# Patient Record
Sex: MALE | Race: WHITE
[De-identification: names, ages, dates, MRNs, and addresses within clinical notes are randomized per-mention and may not be internally consistent; named-entity substitution may affect disease eponyms.]

---

## 2017-05-09 ENCOUNTER — HOSPITAL ENCOUNTER (OUTPATIENT)
Dept: HOSPITAL 63 - ECHO | Age: 52
Discharge: HOME | End: 2017-05-09
Attending: FAMILY MEDICINE
Payer: OTHER GOVERNMENT

## 2017-05-09 DIAGNOSIS — I10: ICD-10-CM

## 2017-05-09 DIAGNOSIS — I08.2: Primary | ICD-10-CM

## 2017-05-09 PROCEDURE — 93306 TTE W/DOPPLER COMPLETE: CPT

## 2017-05-09 NOTE — CARD
--------------- APPROVED REPORT --------------





EXAM: Two-dimensional and M-mode echocardiogram with Doppler and color Doppler.



Other Information 

Quality : GoodHR: 64bpm

Rhythm : NSR



INDICATION

Hypertension/HCVD

Dyspnea with exertion



RISK FACTORS

Obesity   



2D DIMENSIONS 

RVDd3.4 (2.9-3.5cm)Left Atrium(2D)4.1 (1.6-4.0cm)

IVSd1.5 (0.7-1.1cm)Aortic Root(2D)3.5 (2.0-3.7cm)

LVDd4.3 (3.9-5.9cm)LVOT Diameter2.4 (1.8-2.4cm)

PWd1.3 (0.7-1.1cm)LVDs3.0 (2.5-4.0cm)

FS (%) 28.7 %SV45.4 ml

LVEF(%)55.6 (>50%)



Aortic Valve

AoV Peak Yonatan.171.6cm/sAoV VTI34.4cm

AO Peak GR.11.8mmHgLVOT Peak Yonatan.122.5cm/s

LVOT  VTI 28.62cmAO Mean GR.6mmHg

MELLO (VMAX)3.48bi8YFI   (VTI)3.64cm2



Mitral Valve

MV E Unwqcecp565.0cm/sMV E Peak Gr.4mmHg

MV DECEL [id number] A Mflddyuv91.6cm/s

MV E Mean Gr.2mmHgE/A  Ratio1.5

MV A Soabynoj57ea



Pulmonary Valve

PV Peak Djezeolk710.0cm/sPV Peak Grad.7mmHg



Tricuspid Valve

TR P. Qinpywck473yo/sTR Peak Gr.27mmHg



Pulmonary Vein

S1 Hwujkxef94.6cm/sD2 Omhofglh48.1cm/s



 LEFT VENTRICLE 

The left ventricle is normal size. There is mild left ventricular hypertrophy. The left ventricular s
ystolic function is normal. The Ejection Fraction is 55-60%. There is normal LV segmental wall motion
. The left ventricular diastolic function and filling is normal for age.



 RIGHT VENTRICLE 

The right ventricle is normal size. There is normal right ventricular wall thickness. The right ventr
icular systolic function is normal.



 ATRIA 

The left atrium size is normal. The right atrium size is normal. The interatrial septum is intact wit
h no evidence for an atrial septal defect or patent foramen ovale as noted on 2-D or Doppler imaging.




 AORTIC VALVE 

The aortic valve is normal in structure and function. Doppler and Color Flow revealed trace aortic re
gurgitation. There is no significant aortic valvular stenosis.



 MITRAL VALVE 

The mitral valve leaflets are thickened. There is no evidence of mitral valve prolapse. There is no m
itral valve stenosis. Doppler and Color Flow revealed no mitral valve regurgitation noted.



 TRICUSPID VALVE 

Doppler and Color Flow revealed mild tricuspid regurgitation. The pulmonary artery systolic pressure 
is estimated at 30 mmHg. There is no pulmonary hypertension.



 PULMONIC VALVE 

Doppler and Color Flow revealed no pulmonic valvular regurgitation. There is no pulmonic valvular liza
nosis.



 GREAT VESSELS 

The aortic root is normal in size. The ascending aorta is Mildly dilated. The pulmonary artery is nor
mal. The IVC is normal in size and collapses >50% with inspiration.



 PERICARDIAL EFFUSION 

There is no evidence of significant pericardial effusion.



Critical Notification

Critical Value: No



<Conclusion>

The left ventricular systolic function is normal.

The Ejection Fraction is 55-60%.

There is normal LV segmental wall motion.

Trace aortic regurgitation.

Mild tricuspid regurgitation.

The pulmonary artery systolic pressure is estimated at 30 mmHg. 

There is no evidence of significant pericardial effusion.

## 2021-11-03 ENCOUNTER — HOSPITAL ENCOUNTER (OUTPATIENT)
Dept: HOSPITAL 63 - SURG | Age: 56
Discharge: HOME | End: 2021-11-03
Attending: ANESTHESIOLOGY
Payer: OTHER GOVERNMENT

## 2021-11-03 VITALS
DIASTOLIC BLOOD PRESSURE: 88 MMHG | DIASTOLIC BLOOD PRESSURE: 88 MMHG | SYSTOLIC BLOOD PRESSURE: 149 MMHG | SYSTOLIC BLOOD PRESSURE: 149 MMHG | DIASTOLIC BLOOD PRESSURE: 88 MMHG | SYSTOLIC BLOOD PRESSURE: 149 MMHG

## 2021-11-03 DIAGNOSIS — I10: ICD-10-CM

## 2021-11-03 DIAGNOSIS — M54.2: Primary | ICD-10-CM

## 2021-11-03 DIAGNOSIS — E66.9: ICD-10-CM

## 2021-11-03 DIAGNOSIS — M50.30: ICD-10-CM

## 2021-11-03 DIAGNOSIS — Z79.899: ICD-10-CM

## 2021-11-03 PROCEDURE — 99204 OFFICE O/P NEW MOD 45 MIN: CPT

## 2021-11-03 PROCEDURE — G0463 HOSPITAL OUTPT CLINIC VISIT: HCPCS

## 2021-12-09 ENCOUNTER — HOSPITAL ENCOUNTER (OUTPATIENT)
Dept: HOSPITAL 63 - RAD | Age: 56
End: 2021-12-09
Attending: FAMILY MEDICINE
Payer: OTHER GOVERNMENT

## 2021-12-09 DIAGNOSIS — M79.605: Primary | ICD-10-CM

## 2021-12-09 PROCEDURE — 93971 EXTREMITY STUDY: CPT

## 2021-12-09 NOTE — RAD
Examination: LEFT LOWER EXTREMITY - UNILATERAL VENOUS DOPPLER



Technique: Ultrasound evaluation of the left lower extremity was performed from the groin to the uppe
r calf with grey scale, spectral and color doppler evaluation.



Indication: Leg swelling



Comparison: None



Findings: There is normal venous flow and compressibility of left common femoral vein, femoral vein, 
popliteal vein, and visualized proximal calf veins. Of note, exam is limited given suboptimal sonogra
phic penetration.



Impression: No evidence for deep vein thrombosis of left lower extremity from the level of the calf v
eins to the groins.



Electronically signed by: Rufino Bazzi MD (12/9/2021 4:03 PM) TIARA

## 2022-01-19 ENCOUNTER — HOSPITAL ENCOUNTER (OUTPATIENT)
Dept: HOSPITAL 61 - ECHO | Age: 57
End: 2022-01-19
Attending: INTERNAL MEDICINE
Payer: OTHER GOVERNMENT

## 2022-01-19 DIAGNOSIS — R00.2: ICD-10-CM

## 2022-01-19 DIAGNOSIS — I51.7: Primary | ICD-10-CM

## 2022-01-19 PROCEDURE — 93306 TTE W/DOPPLER COMPLETE: CPT

## 2022-01-19 PROCEDURE — C8929 TTE W OR WO FOL WCON,DOPPLER: HCPCS

## 2022-01-19 NOTE — CARD
MR#: [id number]

Account#: [id number]

Accession#: [id number]

Date of Study: 01/19/2022

Ordering Physician: DEANN MOONEY, 

Referring Physician: DEANN MOONEY, 

Tech: [facility]





--------------- APPROVED REPORT --------------





EXAM: Two-dimensional and M-mode echocardiogram with Doppler and color Doppler.



Other Information 

Quality : Technically LimitedHR: 61bpm

Rhythm : NSR



INDICATION

Palpitations 



RISK FACTORS

Hypertension 

Obesity   



2D DIMENSIONS 

RVDd3.0 (2.9-3.5cm)Left Atrium(2D)4.3 (1.6-4.0cm)

IVSd1.1 (0.7-1.1cm)Aortic Root(2D)3.7 (2.0-3.7cm)

LVDd4.9 (3.9-5.9cm)LVOT Diameter2.1 (1.8-2.4cm)

PWd1.1 (0.7-1.1cm)LVDs2.4 (2.5-4.0cm)

FS (%) 50.7 %SV91.0 ml



Aortic Valve

AoV Peak Arnoldo.132.7cm/sAoV VTI24.8cm

AO Peak GR.7.0mmHgLVOT Peak Arnoldo.133.2cm/s

AO Mean GR.3mmHgAVA (VMAX)3.37cm2



Mitral Valve

MV E Pmcmsnad90.6cm/sMV DECEL [id number]

MV A Tlmoayjp41.2cm/sE/A  Ratio0.9



Pulmonary Valve

PV Peak Aikelwmf643.8cm/s



Tricuspid Valve

TR P. Ivdgingc158yb/sTR Peak Gr.24mmHg



 LEFT VENTRICLE 

The left ventricle is normal size. There is borderline concentric left ventricular hypertrophy. The l
eft ventricular systolic function is normal and the ejection fraction is within normal range. LV ejec
tion fraction of 55 to 60%. There is normal LV segmental wall motion. Transmitral Doppler flow patter
n is Grade I-abnormal relaxation pattern.



 RIGHT VENTRICLE 

The right ventricle is normal size. There is normal right ventricular wall thickness. The right ventr
icular systolic function is normal.



 ATRIA 

The left atrium size is normal. The right atrium size is normal. The interatrial septum is intact wit
h no evidence for an atrial septal defect or patent foramen ovale as noted on 2-D or Doppler imaging.




 AORTIC VALVE 

The aortic valve is normal in structure and function. Doppler and Color Flow revealed no significant 
aortic regurgitation. There is no significant aortic valvular stenosis.



 MITRAL VALVE 

The mitral valve is normal in structure and function. There is no evidence of mitral valve prolapse. 
There is no mitral valve stenosis. Doppler and Color-flow revealed trace mitral regurgitation.



 TRICUSPID VALVE 

The tricuspid valve is normal in structure and function. Doppler and Color Flow revealed trace tricus
pid regurgitation. Estimated PAP 27-30 mmHg. There is no tricuspid valve stenosis.



 PULMONIC VALVE 

The pulmonary valve is normal in structure and function. Doppler and Color Flow revealed no pulmonic 
valvular regurgitation.



 GREAT VESSELS 

The aortic root is mildly enlarged. The ascending aorta is mildly dilated. The IVC is normal in size 
and collapses >50% with inspiration.



 PERICARDIAL EFFUSION 

There is no evidence of significant pericardial effusion.



Critical Notification

Critical Value: No



<Conclusion>

The left ventricle is normal size.

The left ventricular systolic function is normal and the ejection fraction is within normal range. 

LV ejection fraction of 55 to 60%.

There is borderline concentric left ventricular hypertrophy.

Doppler and Color Flow revealed no significant aortic regurgitation.

There is no significant aortic valvular stenosis.

Doppler and Color-flow revealed trace mitral regurgitation.

Doppler and Color Flow revealed trace tricuspid regurgitation.  Estimated PAP 27-30 mmHg.

The aortic root is mildly enlarged.



Signed by : Terrence Ulrich MD

Electronically Approved : 01/19/2022 16:07:53

## 2022-04-27 ENCOUNTER — HOSPITAL ENCOUNTER (OUTPATIENT)
Dept: HOSPITAL 63 - RAD | Age: 57
End: 2022-04-27
Attending: FAMILY MEDICINE
Payer: OTHER GOVERNMENT

## 2022-04-27 DIAGNOSIS — M17.12: Primary | ICD-10-CM

## 2022-04-27 DIAGNOSIS — M25.762: ICD-10-CM

## 2022-04-27 DIAGNOSIS — M25.862: ICD-10-CM

## 2022-04-27 PROCEDURE — 73562 X-RAY EXAM OF KNEE 3: CPT

## 2022-04-27 NOTE — RAD
EXAM: LEFT KNEE, 3 VIEWS.



HISTORY: Left knee pain.



COMPARISON: None.



FINDINGS: 



No fractures are identified. There is mild medial compartmental joint space narrowing and osteophytos
is. Alignment is normal. There is no joint effusion. There is some ossification within the interosseo
us membrane proximally.



IMPRESSION:

1. Mild medial compartmental osteoarthritis.



Electronically signed by: NOREEN Montgomery MD (4/27/2022 2:53 PM) [id number]